# Patient Record
Sex: FEMALE | Race: WHITE | NOT HISPANIC OR LATINO | URBAN - METROPOLITAN AREA
[De-identification: names, ages, dates, MRNs, and addresses within clinical notes are randomized per-mention and may not be internally consistent; named-entity substitution may affect disease eponyms.]

---

## 2020-11-10 ENCOUNTER — NURSE TRIAGE (OUTPATIENT)
Dept: OTHER | Facility: OTHER | Age: 35
End: 2020-11-10

## 2020-11-10 DIAGNOSIS — Z20.828 SARS-ASSOCIATED CORONAVIRUS EXPOSURE: Primary | ICD-10-CM

## 2020-11-10 DIAGNOSIS — Z20.828 SARS-ASSOCIATED CORONAVIRUS EXPOSURE: ICD-10-CM

## 2020-11-10 PROCEDURE — U0003 INFECTIOUS AGENT DETECTION BY NUCLEIC ACID (DNA OR RNA); SEVERE ACUTE RESPIRATORY SYNDROME CORONAVIRUS 2 (SARS-COV-2) (CORONAVIRUS DISEASE [COVID-19]), AMPLIFIED PROBE TECHNIQUE, MAKING USE OF HIGH THROUGHPUT TECHNOLOGIES AS DESCRIBED BY CMS-2020-01-R: HCPCS | Performed by: FAMILY MEDICINE

## 2020-11-11 LAB — SARS-COV-2 RNA SPEC QL NAA+PROBE: NOT DETECTED

## 2021-02-11 DIAGNOSIS — Z23 ENCOUNTER FOR IMMUNIZATION: ICD-10-CM

## 2021-09-16 ENCOUNTER — OFFICE VISIT (OUTPATIENT)
Dept: BARIATRICS | Facility: CLINIC | Age: 36
End: 2021-09-16
Payer: COMMERCIAL

## 2021-09-16 VITALS
HEART RATE: 62 BPM | HEIGHT: 66 IN | BODY MASS INDEX: 38.73 KG/M2 | TEMPERATURE: 98.7 F | RESPIRATION RATE: 16 BRPM | WEIGHT: 241 LBS | DIASTOLIC BLOOD PRESSURE: 68 MMHG | SYSTOLIC BLOOD PRESSURE: 118 MMHG

## 2021-09-16 DIAGNOSIS — Z98.84 BARIATRIC SURGERY STATUS: ICD-10-CM

## 2021-09-16 DIAGNOSIS — K91.2 POSTSURGICAL MALABSORPTION: ICD-10-CM

## 2021-09-16 DIAGNOSIS — Z48.815 ENCOUNTER FOR SURGICAL AFTERCARE FOLLOWING SURGERY OF DIGESTIVE SYSTEM: Primary | ICD-10-CM

## 2021-09-16 DIAGNOSIS — E66.9 OBESITY, CLASS II, BMI 35-39.9: ICD-10-CM

## 2021-09-16 DIAGNOSIS — R12 HEARTBURN: ICD-10-CM

## 2021-09-16 PROCEDURE — 99214 OFFICE O/P EST MOD 30 MIN: CPT | Performed by: PHYSICIAN ASSISTANT

## 2021-09-16 RX ORDER — CELECOXIB 200 MG/1
CAPSULE ORAL
COMMUNITY
Start: 2021-08-24

## 2021-09-16 NOTE — PROGRESS NOTES
Assessment/Plan:     Patient ID: Pilar Miguel is a 39 y o  female  Bariatric Surgery Status    -s/p Vertical Sleeve Gastrectomy with Dr Nadine Lundberg in 2015  Presents to the office today for OD annual     Doing well but c/o some weight regain  Attributes this to stress eating at work however she has already started adjusting her diet and modifying her behaviors  Her , is in our surgical program      Reports having reflux as well, states almost daily but does not take anything for it  Given she is over 3 years from sleeve is due for screening egd  Will start with ugi and pending results proceed with either egd or egd bravo  · Continued/Maintain healthy weight loss with good nutrition intakes  · Adequate hydration with at least 64oz  fluid intake  · Follow diet as discussed  · Follow vitamin and mineral recommendations as reviewed with you  · Exercise as tolerated  · Colonoscopy referral made: out of age range    · Follow-up with RD and SW  We kindly ask that your arrive 15 minutes before your scheduled appointment time with your provider to allow our staff to room you, get your vital signs and update your chart  · Get lab work done  Please call the office if you need a script  It is recommended to check with your insurance BEFORE getting labs done to make sure they are covered by your policy  · Call our office if you have any problems with abdominal pain especially associated with fever, chills, nausea, vomiting or any other concerns  · All  Post-bariatric surgery patients should be aware that very small quantities of any alcohol can cause impairment and it is very possible not to feel the effect  The effect can be in the system for several hours  It is also a stomach irritant  · It is advised to AVOID alcohol, Nonsteroidal antiinflammatory drugs (NSAIDS) and nicotine of all forms   Any of these can cause stomach irritation/pain      · Discussed the effects of alcohol on a bariatric patient and the increased impairment risk  · Keep up the good work! Postsurgical Malabsorption   -At risk for malabsorption of vitamins/minerals secondary to malabsorption and restriction of intake from bariatric surgery  -Currently taking adequate postop bariatric surgery vitamin supplementation  -Next set of bariatric labs ordered for approximately 2 weeks  -Patient received education about the importance of adhering to a lifelong supplementation regimen to avoid vitamin/mineral deficiencies      Diagnoses and all orders for this visit:    Encounter for surgical aftercare following surgery of digestive system  -     FL UPPER GI UGI; Future    Bariatric surgery status  -     FL UPPER GI UGI; Future    Postsurgical malabsorption    Obesity, Class II, BMI 35-39 9  -     FL UPPER GI UGI; Future    Heartburn  -     FL UPPER GI UGI; Future    Other orders  -     Calcium Carbonate-Vitamin D (Calcium 500 + D) 500-125 MG-UNIT TABS; Take 1 tablet by mouth 2 (two) times a day  -     Multiple Vitamin (MULTIVITAMIN ADULT PO); Take 2 tablets by mouth daily  -     celecoxib (CeleBREX) 200 mg capsule; TAKE ONE CAPSULE BY MOUTH ONE TO 2 TIMES DAILY WITH FOOD         Subjective:      Patient ID: Andrew Myrick is a 39 y o  female  -s/p Vertical Sleeve Gastrectomy with Dr Fatuma Chavez in 2015  Presents to the office today for OD annual     Marvin Mu  Current:241  EWL: (Weight loss is ahead of schedule at this post surgical period )  Ran: 207 around July 2016  Current BMI is Body mass index is 38 9 kg/m²      · Tolerating a regular diet-yes  · Eating at least 60 grams of protein per day-yes  · Following 30/60 minute rule with liquids-yes  · Drinking at least 64 ounces of fluid per day-yes  · Drinking carbonated beverages-no  · Sufficient exercise-walking and jogging but now limited due to knee injury   · Using NSAIDs regularly- infrequently   · Using nicotine-no  · Using alcohol-social  · Supplements: daily mvi + vit d + b12 + calcium      The following portions of the patient's history were reviewed and updated as appropriate: allergies, current medications, past family history, past medical history, past social history, past surgical history and problem list     Review of Systems   Constitutional: Negative  Respiratory: Negative  Cardiovascular: Negative  Gastrointestinal: Negative  Musculoskeletal: Positive for arthralgias (knee pain )  Skin: Positive for rash (between abdominal skin folds)  Neurological: Negative  Psychiatric/Behavioral: Negative  Objective:    /68 (BP Location: Left arm, Patient Position: Sitting, Cuff Size: Large)   Pulse 62   Temp 98 7 °F (37 1 °C) (Tympanic)   Resp 16   Ht 5' 6" (1 676 m)   Wt 109 kg (241 lb)   BMI 38 90 kg/m²      Physical Exam  Vitals and nursing note reviewed  Constitutional:       Appearance: Normal appearance  She is obese  HENT:      Head: Normocephalic and atraumatic  Eyes:      Extraocular Movements: Extraocular movements intact  Pupils: Pupils are equal, round, and reactive to light  Cardiovascular:      Rate and Rhythm: Normal rate  Pulmonary:      Effort: Pulmonary effort is normal    Musculoskeletal:         General: Normal range of motion  Cervical back: Normal range of motion  Skin:     General: Skin is warm and dry  Neurological:      General: No focal deficit present  Mental Status: She is alert and oriented to person, place, and time     Psychiatric:         Mood and Affect: Mood normal

## 2021-09-16 NOTE — PATIENT INSTRUCTIONS
· Follow-up with RD and DEONTE  We kindly ask that your arrive 15 minutes before your scheduled appointment time with your provider to allow our staff to room you, get your vital signs and update your chart  · Get lab work done  Please call the office if you need a script  It is recommended to check with your insurance BEFORE getting labs done to make sure they are covered by your policy  · Call our office if you have any problems with abdominal pain especially associated with fever, chills, nausea, vomiting or any other concerns  · All  Post-bariatric surgery patients should be aware that very small quantities of any alcohol can cause impairment and it is very possible not to feel the effect  The effect can be in the system for several hours  It is also a stomach irritant  · It is advised to AVOID alcohol, Nonsteroidal antiinflammatory drugs (NSAIDS) and nicotine of all forms   Any of these can cause stomach irritation/pain  · Discussed the effects of alcohol on a bariatric patient and the increased impairment risk  · Keep up the good work!

## 2021-10-07 ENCOUNTER — OFFICE VISIT (OUTPATIENT)
Dept: BARIATRICS | Facility: CLINIC | Age: 36
End: 2021-10-07

## 2021-10-07 VITALS — WEIGHT: 235.4 LBS | BODY MASS INDEX: 37.83 KG/M2 | HEIGHT: 66 IN

## 2021-10-07 DIAGNOSIS — E66.9 OBESITY, CLASS II, BMI 35-39.9: Primary | ICD-10-CM

## 2021-10-07 PROCEDURE — RECHECK

## 2021-11-04 ENCOUNTER — OFFICE VISIT (OUTPATIENT)
Dept: BARIATRICS | Facility: CLINIC | Age: 36
End: 2021-11-04

## 2021-11-04 VITALS — WEIGHT: 234.5 LBS | BODY MASS INDEX: 37.85 KG/M2

## 2021-11-04 DIAGNOSIS — E66.9 OBESITY, CLASS II, BMI 35-39.9: Primary | ICD-10-CM

## 2021-11-04 PROCEDURE — RECHECK: Performed by: SOCIAL WORKER

## 2021-12-02 ENCOUNTER — OFFICE VISIT (OUTPATIENT)
Dept: BARIATRICS | Facility: CLINIC | Age: 36
End: 2021-12-02

## 2021-12-02 VITALS — BODY MASS INDEX: 37.28 KG/M2 | WEIGHT: 232 LBS | HEIGHT: 66 IN

## 2021-12-02 DIAGNOSIS — E66.9 OBESITY, CLASS II, BMI 35-39.9: Primary | ICD-10-CM

## 2021-12-02 PROCEDURE — RECHECK

## 2022-01-11 ENCOUNTER — OFFICE VISIT (OUTPATIENT)
Dept: BARIATRICS | Facility: CLINIC | Age: 37
End: 2022-01-11

## 2022-01-11 VITALS — WEIGHT: 233 LBS | BODY MASS INDEX: 37.61 KG/M2

## 2022-01-11 DIAGNOSIS — E66.9 OBESITY, CLASS II, BMI 35-39.9: Primary | ICD-10-CM

## 2022-01-11 PROCEDURE — RECHECK: Performed by: SOCIAL WORKER

## 2022-01-11 NOTE — PROGRESS NOTES
Ayan Cobos had surgery 6 years ago and is interested iin support to return to her post - op weight  Weight today: 233 at her home scale  Her goal is 295-205 by the end of summer where she is the most comfortable  Over the holidays she did more baking and entertaining more  Also because she was off she had more free time and got away from her work schedule food plan  Eating Behavior - she is grazing and snacking  Especially when she gets home from work and is cooking dinner  Dietician suggested that she space out her breakfast throughout the day and have part of it later in the day to cut down that hunger  She prefers the sweet thing or the salty  Apples and peanut butter  Discussed making her own hummes  They are getting a chihuahua puppy next week  Her transition to home includes: letting the dog out, touching base with her grandmoter, putting her ice pack away and starts dinner and its around 6pm by them  She finds she is eating while she is cooking  She needs to reorganize the kitchen to put snacks for he rest of the family out of sight  She is not tracking her food  She is able to stop eating after dinner  She is aware that planning for how she will manage the time preparing dinner on the way home  He  is starting his pre-surgery diet next week and she plans to have the modified diet to support him  She will also start tracking for that two weeks  Activity - Has appointment with PCP because she is having joint pain and her knee has not gotten better  Mental health and wellness - Still not getting supervision as she would lie and has applied for another job  She is aware that she needs less stress as she has been working a lot of over time but in regards to self-care she needs to find something else  She is giving herself time to change her focus by reading at bedtime, and she has stopped checking in at work when she off   She was able to let staff know that she was off and set the boundary  Goals: To plan  on the way home, how she will handle the transition as she makes dinner: hot drink, piece of fruit eaten slowly, vegetables or salad and to return to tracking her food  She will call in the future for support at needed

## 2022-02-24 ENCOUNTER — OFFICE VISIT (OUTPATIENT)
Dept: BARIATRICS | Facility: CLINIC | Age: 37
End: 2022-02-24

## 2022-02-24 VITALS — WEIGHT: 227.5 LBS | BODY MASS INDEX: 36.56 KG/M2 | HEIGHT: 66 IN

## 2022-02-24 DIAGNOSIS — E66.9 OBESITY, CLASS II, BMI 35-39.9: Primary | ICD-10-CM

## 2022-02-24 PROCEDURE — RECHECK

## 2022-02-24 NOTE — PROGRESS NOTES
Bariatric Follow Up Nutrition Note        Type of surgery  Vertical sleeve gastrectomy  Surgery Date: 9/21/2015  6 years   post-op  Surgeon: Dr Riddhi Silverman  39 y o   female  Height 5' 6" (1 676 m), weight 103 kg (227 lb 8 oz)  Body mass index is 36 72 kg/m²      Weight on Day of Weight Loss Surgery: 330 lbs  Weight in (lb) to have BMI = 25: 155 lbs  Pre-Op Excess Wt: 175#  Post-Op Wt Loss: 123# in 1 year    Review of History and Medications   Past Medical History:   Diagnosis Date    Anemia, iron deficiency     Obesity      Past Surgical History:   Procedure Laterality Date    GALLBLADDER SURGERY      GASTRECTOMY SLEEVE LAPAROSCOPIC  09/21/2015    TONSILLECTOMY  09/2014    WISDOM TOOTH EXTRACTION  2007     Social History     Socioeconomic History    Marital status: /Civil Union     Spouse name: Not on file    Number of children: Not on file    Years of education: Not on file    Highest education level: Not on file   Occupational History    Not on file   Tobacco Use    Smoking status: Former Smoker    Smokeless tobacco: Never Used    Tobacco comment: as a teenager   Substance and Sexual Activity    Alcohol use: Yes     Comment: socially    Drug use: Never    Sexual activity: Yes     Birth control/protection: Male Sterilization     Comment:  had a vasectomy   Other Topics Concern    Not on file   Social History Narrative    No caffeine     Social Determinants of Health     Financial Resource Strain: Not on file   Food Insecurity: Not on file   Transportation Needs: Not on file   Physical Activity: Not on file   Stress: Not on file   Social Connections: Not on file   Intimate Partner Violence: Not on file   Housing Stability: Not on file       Current Outpatient Medications:     Calcium Carbonate-Vitamin D (Calcium 500 + D) 500-125 MG-UNIT TABS, Take 1 tablet by mouth 2 (two) times a day, Disp: , Rfl:     celecoxib (CeleBREX) 200 mg capsule, TAKE ONE CAPSULE BY MOUTH ONE TO 2 TIMES DAILY WITH FOOD, Disp: , Rfl:     Multiple Vitamin (MULTIVITAMIN ADULT PO), Take 2 tablets by mouth daily, Disp: , Rfl:     Food Intake and Lifestyle Assessment   Food Intake Assessment completed via usual diet recall  Breakfast:[employer]   Snack: Protein shake -Lean shake 25 gm protein   Lunch: leftovers or salad with lunch meat, veggies or sandwich with 647 bread  Snack: when stressed, chips or Cheetohs otherwise apple slices  Dinner: salmon, oven roasted potatoes, steamed vegetables, little red meat  Snack: sometimes  Beverage intake: water  Diet texture/stage: regular  Protein supplement: 1 bar, 1 shake daily  Estimated protein intake per day: 70gm  Estimated fluid intake per day: 90 gm  Meals eaten away from home: 2 times/week  Typical meal pattern: 2-3 meals per day and 0-2 snacks per day  Eating Behaviors: Does not drink with meals and waits 30-minutes after meal before resuming drinking, Frequent snacking/ grazing, Emotional eating, Craves sweet foods and Craves salty foods    Food allergies or intolerances: none  Cultural or Mosque considerations: N/A    Physical Assessment  Nutrition Related Findings  Nausea and Return of Hunger    Physical Activity  Types of exercise: Stretching  Walking  Current physical limitations: Having knee surgery 3/11/2022    Psychosocial Assessment   Support systems: spouse  Socioeconomic factors: Works full time as , lives with  who is going to have WLS here    Nutrition Diagnosis  Diagnosis: Overweight / Obesity (NC-3 3) and Undesirable food choices (NB-1 7)  Related to: Limited adherence to nutrition-related recommendations, Physical inactivity and Excessive energy intake  As Evidenced by: Unintentional weight gain     Interventions and Teaching   Patient educated on post-op nutrition guidelines  Patient educated and handouts provided    Surgical changes to stomach / GI  Capacity of post-surgery stomach  Diet progression  Adequate hydration  Sugar and fat restriction to decrease "dumping syndrome"  Fat restriction to decrease steatorrhea  Expected weight loss  Weight loss plateaus/ possibility of weight regain  Exercise  Suggestions for pre-op diet  Nutrition considerations after surgery  Protein supplements  Meal planning and preparation  Appropriate carbohydrate, protein, and fat intake, and food/fluid choices to maximize safe weight loss, nutrient intake, and tolerance   Dietary and lifestyle changes  Possible problems with poor eating habits  Intuitive eating  Techniques for self monitoring and keeping daily food journal  Potential for food intolerance after surgery, and ways to deal with them including: lactose intolerance, nausea, reflux, vomiting, diarrhea, food intolerance, appetite changes, gas  Vitamin / Mineral supplementation of Multivitamin with minerals and Vitamin D    Education provided to: patient    Barriers to learning: No barriers identified    Readiness to change: monitoring    Comprehension: verbalizes understanding     Expected Compliance: good    Goals  Food journal, Exercise 30 minutes 5 times per week, Eat 3 meals per day and Eliminate mindless snacking     Time Spent:   30 Minutes